# Patient Record
Sex: FEMALE | NOT HISPANIC OR LATINO | Employment: UNEMPLOYED | URBAN - METROPOLITAN AREA
[De-identification: names, ages, dates, MRNs, and addresses within clinical notes are randomized per-mention and may not be internally consistent; named-entity substitution may affect disease eponyms.]

---

## 2021-09-24 ENCOUNTER — OFFICE VISIT (OUTPATIENT)
Dept: FAMILY MEDICINE | Facility: CLINIC | Age: 41
End: 2021-09-24
Payer: COMMERCIAL

## 2021-09-24 VITALS
OXYGEN SATURATION: 99 % | DIASTOLIC BLOOD PRESSURE: 84 MMHG | BODY MASS INDEX: 25.07 KG/M2 | HEART RATE: 82 BPM | WEIGHT: 141.5 LBS | TEMPERATURE: 97.8 F | SYSTOLIC BLOOD PRESSURE: 116 MMHG

## 2021-09-24 DIAGNOSIS — D50.8 OTHER IRON DEFICIENCY ANEMIA: ICD-10-CM

## 2021-09-24 DIAGNOSIS — N92.6 MISSED MENSES: Primary | ICD-10-CM

## 2021-09-24 PROBLEM — D50.9 IRON DEFICIENCY ANEMIA: Status: ACTIVE | Noted: 2021-03-23

## 2021-09-24 PROBLEM — F41.1 GAD (GENERALIZED ANXIETY DISORDER): Status: ACTIVE | Noted: 2021-05-13

## 2021-09-24 PROBLEM — E06.3 HYPOTHYROIDISM DUE TO HASHIMOTO'S THYROIDITIS: Status: ACTIVE | Noted: 2021-03-24

## 2021-09-24 LAB
ERYTHROCYTE [DISTWIDTH] IN BLOOD BY AUTOMATED COUNT: 13 % (ref 10–15)
HCG UR QL: NEGATIVE
HCT VFR BLD AUTO: 39.8 % (ref 35–47)
HGB BLD-MCNC: 12.9 G/DL (ref 11.7–15.7)
MCH RBC QN AUTO: 29.4 PG (ref 26.5–33)
MCHC RBC AUTO-ENTMCNC: 32.4 G/DL (ref 31.5–36.5)
MCV RBC AUTO: 91 FL (ref 78–100)
PLATELET # BLD AUTO: 210 10E3/UL (ref 150–450)
RBC # BLD AUTO: 4.39 10E6/UL (ref 3.8–5.2)
WBC # BLD AUTO: 5 10E3/UL (ref 4–11)

## 2021-09-24 PROCEDURE — 84443 ASSAY THYROID STIM HORMONE: CPT | Performed by: PHYSICIAN ASSISTANT

## 2021-09-24 PROCEDURE — 85027 COMPLETE CBC AUTOMATED: CPT | Performed by: PHYSICIAN ASSISTANT

## 2021-09-24 PROCEDURE — 99203 OFFICE O/P NEW LOW 30 MIN: CPT | Performed by: PHYSICIAN ASSISTANT

## 2021-09-24 PROCEDURE — 36415 COLL VENOUS BLD VENIPUNCTURE: CPT | Performed by: PHYSICIAN ASSISTANT

## 2021-09-24 PROCEDURE — 81025 URINE PREGNANCY TEST: CPT | Performed by: PHYSICIAN ASSISTANT

## 2021-09-24 PROCEDURE — 84146 ASSAY OF PROLACTIN: CPT | Performed by: PHYSICIAN ASSISTANT

## 2021-09-24 PROCEDURE — 83001 ASSAY OF GONADOTROPIN (FSH): CPT | Performed by: PHYSICIAN ASSISTANT

## 2021-09-24 PROCEDURE — 83550 IRON BINDING TEST: CPT | Performed by: PHYSICIAN ASSISTANT

## 2021-09-24 PROCEDURE — 82728 ASSAY OF FERRITIN: CPT | Performed by: PHYSICIAN ASSISTANT

## 2021-09-24 RX ORDER — FEXOFENADINE HCL 180 MG/1
TABLET ORAL
COMMUNITY

## 2021-09-24 RX ORDER — ASCORBIC ACID 500 MG
500 TABLET ORAL
COMMUNITY
Start: 2021-03-24 | End: 2022-01-25

## 2021-09-24 RX ORDER — LEVOTHYROXINE SODIUM 50 UG/1
50 TABLET ORAL
COMMUNITY
Start: 2021-05-14 | End: 2022-03-17

## 2021-09-24 RX ORDER — FERROUS SULFATE 325(65) MG
325 TABLET, DELAYED RELEASE (ENTERIC COATED) ORAL
COMMUNITY
Start: 2021-06-09 | End: 2021-12-21

## 2021-09-24 NOTE — PROGRESS NOTES
Assessment & Plan     Missed menses  Check urine pregnancy today.   Further labs obtained as noted below.  Follow up with OB/GYN if testing all normal and menstrual cycle does not return in the next 1-2 months.  Patient is not underweight and PCOS does not seem likely (no signs of metabolic disorder, no acanthosis nigrans)   - HCG Qual, Urine ([id number]); Future  - Follicle stimulating hormone; Future  - Prolactin; Future  - TSH with free T4 reflex; Future  - CBC with platelets; Future  - Ob/Gyn Referral; Future  - HCG Qual, Urine ([id number])  - Follicle stimulating hormone  - Prolactin  - TSH with free T4 reflex  - CBC with platelets    Other iron deficiency anemia    - Ferritin; Future  - Iron and iron binding capacity; Future  - Ferritin  - Iron and iron binding capacity    Ordering of each unique test  23 minutes spent on the date of the encounter doing chart review, history and exam, documentation and further activities per the note        Return if symptoms worsen or fail to improve.    Lesley Pate PA-C  [facility] JOBY Oliver is a 41 year old who presents for the following health issues    HPI     Patient is here because she hasn't had a period for two months. Her last period was on 07/13/21. She states her last period was heavier than normal. She does indicate she took a pregnancy test at home and was negative.    She denies any recent weight loss or changes in diet. She indicates perhaps she is having some hot flashes (but indicates this is not unusual for her). She has some vaginal itching occasionally which she may attribute to dryness.    She has had 1 child. Never had a hysteroscopy or D and C    Review of Systems   GENERAL:  No fevers         Objective    /84 (BP Location: Right arm, Patient Position: Chair, Cuff Size: Adult Regular)   Pulse 82   Temp 97.8  F (36.6  C) (Oral)   Wt 64.2 kg (141 lb 8 oz)   LMP 07/13/2021 (Exact Date)   SpO2 99%    BMI 25.07 kg/m    Body mass index is 25.07 kg/m .  Physical Exam   GENERAL: No acute distress  HEENT: Normocephalic,  CARDIAC: Regular rate and rhythm. No murmurs.  PULMONARY: Lungs are clear to auscultation bilaterally. No wheezes, rhonchi or crackles.  GI: Active bowel sounds, abdomen is soft and non-tender  NEURO: Alert and non-focal    No results found for this or any previous visit (from the past 24 hour(s)).

## 2021-09-27 LAB
FERRITIN SERPL-MCNC: 25 NG/ML (ref 12–150)
FSH SERPL-ACNC: 46.8 IU/L
IRON SATN MFR SERPL: 25 % (ref 15–46)
IRON SERPL-MCNC: 90 UG/DL (ref 35–180)
PROLACTIN SERPL-MCNC: 6 UG/L (ref 3–27)
TIBC SERPL-MCNC: 367 UG/DL (ref 240–430)
TSH SERPL DL<=0.005 MIU/L-ACNC: 2.79 MU/L (ref 0.4–4)

## 2021-10-03 ENCOUNTER — HEALTH MAINTENANCE LETTER (OUTPATIENT)
Age: 41
End: 2021-10-03

## 2021-12-20 ENCOUNTER — MYC MEDICAL ADVICE (OUTPATIENT)
Dept: FAMILY MEDICINE | Facility: CLINIC | Age: 41
End: 2021-12-20
Payer: COMMERCIAL

## 2021-12-20 NOTE — TELEPHONE ENCOUNTER
Called patient regarding below.  Has had period for almost 10 days.  Not heavy bleeding.  Scheduled for tomorrow at 7:40 am.  Also states she moved and needs refills.  Discussed Lesley is not a primary provider but will need to discuss tomorrow if needs some to get by for now.  Daniela Busch RN

## 2021-12-21 ENCOUNTER — OFFICE VISIT (OUTPATIENT)
Dept: FAMILY MEDICINE | Facility: CLINIC | Age: 41
End: 2021-12-21
Payer: COMMERCIAL

## 2021-12-21 VITALS
DIASTOLIC BLOOD PRESSURE: 84 MMHG | TEMPERATURE: 98.1 F | BODY MASS INDEX: 24.38 KG/M2 | HEART RATE: 84 BPM | SYSTOLIC BLOOD PRESSURE: 127 MMHG | HEIGHT: 63 IN | WEIGHT: 137.6 LBS | OXYGEN SATURATION: 99 % | RESPIRATION RATE: 16 BRPM

## 2021-12-21 DIAGNOSIS — D50.8 OTHER IRON DEFICIENCY ANEMIA: ICD-10-CM

## 2021-12-21 DIAGNOSIS — N93.9 VAGINAL BLEEDING: Primary | ICD-10-CM

## 2021-12-21 LAB
ERYTHROCYTE [DISTWIDTH] IN BLOOD BY AUTOMATED COUNT: 12.2 % (ref 10–15)
FERRITIN SERPL-MCNC: 26 NG/ML (ref 12–150)
HCT VFR BLD AUTO: 40.9 % (ref 35–47)
HGB BLD-MCNC: 13.2 G/DL (ref 11.7–15.7)
IRON SATN MFR SERPL: 21 % (ref 15–46)
IRON SERPL-MCNC: 77 UG/DL (ref 35–180)
MCH RBC QN AUTO: 29.7 PG (ref 26.5–33)
MCHC RBC AUTO-ENTMCNC: 32.3 G/DL (ref 31.5–36.5)
MCV RBC AUTO: 92 FL (ref 78–100)
PLATELET # BLD AUTO: 180 10E3/UL (ref 150–450)
RBC # BLD AUTO: 4.44 10E6/UL (ref 3.8–5.2)
TIBC SERPL-MCNC: 364 UG/DL (ref 240–430)
WBC # BLD AUTO: 4.6 10E3/UL (ref 4–11)

## 2021-12-21 PROCEDURE — 82728 ASSAY OF FERRITIN: CPT | Performed by: PHYSICIAN ASSISTANT

## 2021-12-21 PROCEDURE — 83550 IRON BINDING TEST: CPT | Performed by: PHYSICIAN ASSISTANT

## 2021-12-21 PROCEDURE — 85027 COMPLETE CBC AUTOMATED: CPT | Performed by: PHYSICIAN ASSISTANT

## 2021-12-21 PROCEDURE — 99213 OFFICE O/P EST LOW 20 MIN: CPT | Performed by: PHYSICIAN ASSISTANT

## 2021-12-21 PROCEDURE — 36415 COLL VENOUS BLD VENIPUNCTURE: CPT | Performed by: PHYSICIAN ASSISTANT

## 2021-12-21 RX ORDER — FERROUS SULFATE 325(65) MG
325 TABLET, DELAYED RELEASE (ENTERIC COATED) ORAL DAILY
Qty: 90 TABLET | Refills: 3 | Status: SHIPPED | OUTPATIENT
Start: 2021-12-21 | End: 2022-01-25

## 2021-12-21 ASSESSMENT — MIFFLIN-ST. JEOR: SCORE: 1258.28

## 2021-12-21 NOTE — PATIENT INSTRUCTIONS
[facility] [address]   [facility] [address]   Phone: [phone]     Dr. Franklin, Dr. Hancock, Dr. Faust

## 2021-12-21 NOTE — PROGRESS NOTES
Assessment & Plan     Vaginal bleeding  Last time patient was seen she had not had a menstrual cycle in some time now she is having more spotting. Testing last time suggested patient was perimenopausal. Testing obtained today as noted below.   Still recommend follow up with OB/GYN.  - CBC with platelets; Future  - Iron and iron binding capacity; Future  - Ferritin; Future  - CBC with platelets  - Iron and iron binding capacity  - Ferritin    Other iron deficiency anemia    - CBC with platelets; Future  - Iron and iron binding capacity; Future  - Ferritin; Future  - ferrous sulfate (FE TABS) 325 (65 Fe) MG EC tablet; Take 1 tablet (325 mg) by mouth daily  - CBC with platelets  - Iron and iron binding capacity  - Ferritin    Ordering of each unique test  Prescription drug management      Return if symptoms worsen or fail to improve.    ALFONZO Blackmon [facility]    Meagan Oliver is a 41 year old who presents for the following health issues     HPI     Menstrual Concern  Onset/Duration: 19 days  Description:   Duration of bleeding episodes: 19 days  Frequency of periods: (1st day of one to 1st day of next):  every 28 days  Describe bleeding/flow:   Clots: no  Number of pads/day: 1        Cramping: none  Accompanying Signs & Symptoms:  Lightheadedness: dizziness and nausea  Temperature intolerance: no  Nosebleeds/Easy bruising: no  Vaginal Discharge: YES- brownish initially (having more bleeding now)  Acne: no  Change in body hair: no  History:  Patient's last menstrual period was 12/02/2021 (approximate).  Previous normal periods: YES  Contraceptive use: NO  Sexually active: no  Any bleeding after intercourse: not applicable  Abnormal PAP Smears: no  Precipitating or alleviating factors: None  Therapies tried and outcome: None      Review of Systems   GENERAL:  No fevers        Objective    /84 (BP Location: Right arm, Patient Position: Sitting, Cuff Size: Adult  "Regular)   Pulse 84   Temp 98.1  F (36.7  C) (Oral)   Resp 16   Ht 1.6 m (5' 3\")   Wt 62.4 kg (137 lb 9.6 oz)   LMP 12/02/2021 (Approximate)   SpO2 99%   BMI 24.37 kg/m    Body mass index is 24.37 kg/m .  Physical Exam   GENERAL: No acute distress  HEENT: Normocephalic  NEURO: Alert and non-focal          "

## 2022-01-25 ENCOUNTER — OFFICE VISIT (OUTPATIENT)
Dept: OBGYN | Facility: CLINIC | Age: 42
End: 2022-01-25
Payer: COMMERCIAL

## 2022-01-25 VITALS — SYSTOLIC BLOOD PRESSURE: 106 MMHG | BODY MASS INDEX: 24.61 KG/M2 | DIASTOLIC BLOOD PRESSURE: 84 MMHG | WEIGHT: 138.9 LBS

## 2022-01-25 DIAGNOSIS — N95.1 PERIMENOPAUSE: ICD-10-CM

## 2022-01-25 DIAGNOSIS — N93.9 ABNORMAL UTERINE BLEEDING (AUB): Primary | ICD-10-CM

## 2022-01-25 DIAGNOSIS — D50.8 OTHER IRON DEFICIENCY ANEMIA: ICD-10-CM

## 2022-01-25 LAB
CLUE CELLS: ABNORMAL
TRICHOMONAS, WET PREP: ABNORMAL
WBC'S/HIGH POWER FIELD, WET PREP: ABNORMAL
YEAST, WET PREP: ABNORMAL

## 2022-01-25 PROCEDURE — 87491 CHLMYD TRACH DNA AMP PROBE: CPT | Performed by: REGISTERED NURSE

## 2022-01-25 PROCEDURE — 99204 OFFICE O/P NEW MOD 45 MIN: CPT | Performed by: REGISTERED NURSE

## 2022-01-25 PROCEDURE — 87210 SMEAR WET MOUNT SALINE/INK: CPT | Performed by: REGISTERED NURSE

## 2022-01-25 PROCEDURE — 87591 N.GONORRHOEAE DNA AMP PROB: CPT | Performed by: REGISTERED NURSE

## 2022-01-25 RX ORDER — DESOGESTREL AND ETHINYL ESTRADIOL 0.15-0.03
1 KIT ORAL DAILY
Qty: 28 TABLET | Refills: 0 | Status: SHIPPED | OUTPATIENT
Start: 2022-01-25 | End: 2022-09-26

## 2022-01-25 RX ORDER — FERROUS SULFATE 325(65) MG
325 TABLET, DELAYED RELEASE (ENTERIC COATED) ORAL DAILY
Qty: 90 TABLET | Refills: 3 | Status: SHIPPED | OUTPATIENT
Start: 2022-01-25 | End: 2022-09-26

## 2022-01-25 NOTE — PATIENT INSTRUCTIONS
Please either come in for blood pressure check or report blood pressure value taken at pharmacy in 1 month's time. If blood pressure taken at pharmacy, please send value via GELI. Prescription for birth control can then be refilled for 1 year.     Please be seen annually for preventative health visit. Insurance normally covers 1 preventative health visit a year so keep this in mind when scheduling with your primary care provider versus OB/GYN for prevenatative. Continue to be seen for recommended follow-up visits.      Please follow-up with your doctor that is managing your Hashimoto's thyroiditis if you are in need of synthroid refill.        Patient Education     Perimenopause  Menopause is when you stop having periods for good. For many women, this happens around age 50. The time of change before this is called perimenopause. It may start in your late 30s or 40s. It can last for months or years. During this time, your body makes lower levels of female hormones. This causes certain changes in your body. You may already have begun to feel the effects of these changes. By taking steps to relieve symptoms, you can still feel good.   The menstrual cycle  Hormones are chemicals that have specific jobs in the body. A menstrual cycle is caused by changes in the levels of 2 female hormones. These hormones are estrogen and progesterone. They are made by the ovaries. In a normal cycle, estrogen creates a lining in the uterus to allow for pregnancy. The ovary then releases an egg. This is called ovulation. Progesterone levels start to go up. If the egg is not fertilized, progesterone levels go down. This causes the lining in the uterus to be shed. This is the bleeding that is your period.   Changes in hormones  As a woman ages, her ovaries begin to make hormones less regularly. In some months, there may not be ovulation. Without ovulation, progesterone isn't secreted so a normal period doesn't occur. The menstrual cycle  will be harder to predict. But some months ovulation may occur and pregnancy can occur Over time, the ovaries stop working. This can cause symptoms. Some women who have had their uterus taken out (hysterectomy) but still have ovaries may still have symptoms. When estrogen levels reach their lowest point, periods will stop completely. This is menopause.   Symptoms of perimenopause  The change in hormones can cause physical symptoms. It can also cause emotional symptoms. These may include:     Periods that come more or less often    Periods that are lighter or heavier than you re used to    Hot flashes, night sweats, or trouble sleeping    Vaginal dryness, which may make sex painful    Mood swings or fatigue    Palpitations    Sleep disturbances    Urinary symptoms including frequency and incontinence  Medicines that may help  Some medicines can help ease the effects of perimenopause. These include:     Low-dose birth control pills. These often contain both estrogen and progesterone. They can help regulate your periods.    Hormone therapy (HT). This replaces some of the hormones your body has stopped making.    Antidepressants. These help balance brain chemicals that may decrease during this time. Signs of depression can include often feeling sad or hopeless. If you feel this way, be sure to talk to your healthcare provider.    Gabapentin. This is a medicine also used to treat seizures. This controls hot flashes and night sweats in some women.    Clonidine. This medicine may control hot flashes and night sweats.  [employer] last reviewed this educational content on 7/1/2020 2000-2021 [employer]. All rights reserved. This information is not intended as a substitute for professional medical care. Always follow your healthcare professional's instructions.    WARNING SIGNS AND CAUTIONS (ACHES)  Combined Oral Contraceptives (Pills), Combined Transdermal Contraceptives (Patch),  Combined Vaginal Ring  Contraceptives  You have chosen a combined hormone birth control method for your birth control. The following are warning  signs of problems. Remember the word  ACHES .  A - Abdominal (stomach) pain (severe) may mean a possible ruptured liver tumor, cyst, tubal pregnancy  C - Chest pain (sharp, crushing or heaviness) may mean possible heart attack; sudden shortness of breath,  persistent cough or coughing up blood indicating possible blood clot in lungs  H - Headaches (sudden severe) or vomiting, dizziness or fainting, weakness or numbness in an arm or leg or  disturbances of speech may mean a possible stroke  E - Eye problems (blurring vision, flashing lights or partial/complete loss of vision) may mean a possible clot in the  eye or other blood flow problems  S - Sudden leg pain in calf or thigh or redness, heat or swelling in calf or thigh, may mean possible blood clots    If you experience any of these warning signs, call or see a health care provider as soon as possible

## 2022-01-25 NOTE — PROGRESS NOTES
Abnormal Uterine Bleeding Note    Date: 2022    CC:  Abnormal Uterine Bleeding    HPI:  Benito Peña is a 42 year old female  presents for evaluation of abnormal uterine bleeding as a referral from Marge Landeros.        Infections: was having itching around time of bleeding. Itching present on external and internal, not currently itching. Resolved after bleeding stopped. Uses pads. Does share that she uses wipes on the outside and on the inside of her vagina. Cleans out vagina with water.     Has always been sensitive to temperature. No hot flashes. No other signs and symptoms related to perimenopause period.     Her work-up thus far for her abnormal bleeding has included:  - pregnancy test: negative at last visit, no unprotected sex  Since  - FSH- 46.8  - TSH: 2.79  - transvaginal ultrasound: not done   - Hgb: 13.2    - STI screen: offered and accepted   - Last pap: UTD and normal result     Patient has tried to following treatments: None    GYN HISTORY:  Patient's last menstrual period was 2021 (approximate).    STI history: No STD history  Duration of bleeding problem: Abnormal uterine bleeding started this past summer, 2021   Frequency of cyles: 21-60 days, lasting few days to 19 days   Flow: spotting to heavy, changes a  every 2 hours at the heaviest  No pain/cramping   Breakthrough bleeding: no   Post-coital bleeding: no  Pelvic pain: no    History of abnormal pap:  No   History of cervical procedures: No    Contraceptive History: Using condoms currently, has used paraguard one time after birth of son but had it removed. Denies any side effects with use, just did not want it in place anymore  The patient is sexually active with male partners.       Patient has following risk factors for endometrial cancer:  - Unopposed estrogen exposure: No   - Obesity: No  - Smoking: No  - Nulliparity: No  - Infertility: No  - Early age of menarche / late age of menopause: Yes.  Details: at age 13 or 14   - Family history of colon cancer, ovarian cancer, and type I endometrial cancer: No family history     OBSTETRIC HISTORY:   OB History    Para Term  AB Living   1 1 1 0 0 0   SAB IAB Ectopic Multiple Live Births   0 0 0 0 1      # Outcome Date GA Lbr Sudeep/2nd Weight Sex Delivery Anes PTL Lv   1 Term              Patient Active Problem List    Diagnosis Date Noted     ELIA (generalized anxiety disorder) 2021     Priority: Medium     Hypothyroidism due to Hashimoto's thyroiditis 2021     Priority: Medium     Iron deficiency anemia 2021     Priority: Medium     CARDIOVASCULAR SCREENING; LDL GOAL LESS THAN 160 2013     Priority: Medium       Past Medical History:   Diagnosis Date     No active medical problems          Past Surgical History:   Procedure Laterality Date     NO HISTORY OF SURGERY         Family History   Problem Relation Age of Onset     Hypertension Mother      Asthma Maternal Grandmother      There is not  family history of uterine, ovarian, breast, colon cancer.     Current Outpatient Medications   Medication Sig Dispense Refill     ferrous sulfate (FE TABS) 325 (65 Fe) MG EC tablet Take 1 tablet (325 mg) by mouth daily 90 tablet 3     fexofenadine (ALLEGRA ALLERGY) 180 MG tablet        levothyroxine (SYNTHROID/LEVOTHROID) 50 MCG tablet Take 50 mcg by mouth       vitamin C (ASCORBIC ACID) 500 MG tablet Take 500 mg by mouth         Allergies: Patient has no known allergies.    ROS:  C: NEGATIVE for fever, chills, change in weight  I: NEGATIVE for worrisome rashes, moles or lesions  E: NEGATIVE for vision changes or irritation  E/M: NEGATIVE for ear, mouth and throat problems  R: NEGATIVE for significant cough or SOB  CV: NEGATIVE for chest pain, palpitations or peripheral edema  GI: NEGATIVE for nausea, abdominal pain, heartburn, or change in bowel habits  : POSITIVE for abnormal bleeding as above, NEGATIVE for frequency, dysuria,  hematuria, vaginal discharge  M: NEGATIVE for significant arthralgias or myalgia  N: NEGATIVE for weakness, dizziness or paresthesias  E: NEGATIVE for temperature intolerance, skin/hair changes  P: NEGATIVE for changes in mood or affect    EXAM:  Blood pressure 106/84, weight 63 kg (138 lb 14.4 oz), last menstrual period 2021, not currently breastfeeding.   BMI= Body mass index is 24.61 kg/m .  General - pleasant female in no acute distress.  Abdomen - soft, nontender  Pelvic - external genitalia: normal adult female without lesions or abnormalities   BUS: within normal limits  Vagina: well rugated, no discharge, no lesions  Cervix: no lesions or CMT  Uterus: Normal size, mobile and nontender. No fibroids or adhesions suspected  Adnexae: no masses or tenderness.  Rectovaginal - deferred.  Musculoskeletal - no gross deformities.  Neurological - normal strength, sensation, and mental status.    Labs from PCP visit 2021  Results for BENITO HOOVER (MRN [id number]) as of 2022 19:27   Ref. Range 2021 16:42   Ferritin Latest Ref Range: 12 - 150 ng/mL 25   FSH Latest Units: IU/L 46.8   Iron Latest Ref Range: 35 - 180 ug/dL 90   Iron Binding Cap Latest Ref Range: 240 - 430 ug/dL 367   Iron Saturation Index Latest Ref Range: 15 - 46 % 25   Prolactin Latest Ref Range: 3 - 27 ug/L 6   TSH Latest Ref Range: 0.40 - 4.00 mU/L 2.79     Results for BENITO HOOVER (MRN [id number]) as of 2022 19:27   Ref. Range 2021 16:43   HCG Qual Urine Latest Ref Range: Negative  Negative     ASSESSMENT:  Benito Hoover is a 42 year old  who presents with abnormal uterine bleeding. Discussed differential diagnosis: perimenopause      PLAN:    ICD-10-CM    1. Abnormal uterine bleeding (AUB)  N93.9 Wet prep - Clinic Collect     Chlamydia & Gonorrhea by PCR, GICH/Range - Clinic Collect     desogestrel-ethinyl estradiol (APRI) 0.15-30 MG-MCG tablet     ferrous sulfate (FE TABS) 325 (65 Fe) MG EC tablet    2. Perimenopause  N95.1 desogestrel-ethinyl estradiol (APRI) 0.15-30 MG-MCG tablet   3. Other iron deficiency anemia  D50.8 ferrous sulfate (FE TABS) 325 (65 Fe) MG EC tablet         Endometrial biopsy was not today to rule out hyperplasia and malignancy.   Transvaginal ultrasound: no. Bimanual exam normal. Has not had pelvic pain with bleeding, not concerned for fibroids. Given elevated FSH, normal pelvic exam findings, normal TSH and prolactin, negative HCG, and bleeding pattern consistent with perimenopause, perimenopause diagnosed.   Cervical cancer screening: not due at this time   Collected GC/CT and wet prep to rule out infection   Reorderd iron tabs as well- says she has 3 refills but reports pharmacy says she needs to talk to her doctor to refill  Reviewed need to follow-up with provider that is managing Hashimoto's for refill of synthroid as they may require additional lab testing/adjusting of dosage (TSH was normal at 2.79 in 9/2021)  Plan for low dose birth control (apri) for cycle control. Not experiencing vasomotor s/s of perimenopause and desires cycle control so low dose KOLTON better option over HRT. Reviewed alternative therapies including doing nothing, utilizing Mirena IUD, acupuncture, and options for ablation as an option if trial of birth control not successful. Elects KOLTON trial at this time   F/U in one month for BP check. No contraindications to COCs at this time. Will refill for year if BP WNL and no contraindications arise.        Ilda Marie, DNP, APRN, CNM

## 2022-01-25 NOTE — NURSING NOTE
"Chief Complaint   Patient presents with     Abnormal Uterine Bleeding     Nov  spotting started   Perioid Dec 2nd  until first week of    heavy bleeding        Initial /84 (BP Location: Right arm, Cuff Size: Adult Regular)   Wt 63 kg (138 lb 14.4 oz)   LMP 2021 (Approximate)   Breastfeeding No   BMI 24.61 kg/m   Estimated body mass index is 24.61 kg/m  as calculated from the following:    Height as of 21: 1.6 m (5' 3\").    Weight as of this encounter: 63 kg (138 lb 14.4 oz).  BP completed using cuff size: regular    Questioned patient about current smoking habits.  Pt. has never smoked.          The following HM Due: NONE      Yina Burgos, LINSEY on 2022 at 10:09 AM       "

## 2022-01-26 LAB
C TRACH DNA SPEC QL PROBE+SIG AMP: NEGATIVE
N GONORRHOEA DNA SPEC QL NAA+PROBE: NEGATIVE

## 2022-03-15 DIAGNOSIS — E06.3 HYPOTHYROIDISM DUE TO HASHIMOTO'S THYROIDITIS: Primary | ICD-10-CM

## 2022-03-15 RX ORDER — LEVOTHYROXINE SODIUM 50 UG/1
50 TABLET ORAL
Status: CANCELLED | OUTPATIENT
Start: 2022-03-15

## 2022-03-15 NOTE — TELEPHONE ENCOUNTER
Routing refill request to provider for review/approval because:  Medication is reported/historical    Salbador CARTAGENA RN

## 2022-03-15 NOTE — TELEPHONE ENCOUNTER
Medication Question or Refill    Who is calling: Benito    What medication are you calling about (include dose and sig)?: Levothyroxine    Controlled Substance Agreement on file:     Who prescribed the medication?:     Do you need a refill? Yes:     When did you use the medication last?     Patient offered an appointment? No    Do you have any questions or concerns?  No    Requested Pharmacy: [facility]    Okay to leave a detailed message?: Yes at Cell number on file:    Telephone Information:   Mobile [phone]

## 2022-03-17 NOTE — TELEPHONE ENCOUNTER
Called pt,     ~new pt, has never seen NWD  ~pt saw LG 9/24/21, LG did TSH but has not refilled any thyroid medication  ~now pt needs thyroid refill  ~willing to establish care with NWD (someone entered her as PCP but NWD has never seen pt)    TSH   Date Value Ref Range Status   09/24/2021 2.79 0.40 - 4.00 mU/L Final     ROUTED TO  COVERING PROVIDER POOL, PLEASE ADVISE THYROID REFILL, OK TO REFILL X 1?    ROUTE to inform pt of plan  Dana Jolley, RN, BSN  [facility]

## 2022-03-18 RX ORDER — LEVOTHYROXINE SODIUM 50 UG/1
50 TABLET ORAL DAILY
Qty: 90 TABLET | Refills: 0 | Status: SHIPPED | OUTPATIENT
Start: 2022-03-18 | End: 2022-08-05

## 2022-09-02 DIAGNOSIS — E06.3 HYPOTHYROIDISM DUE TO HASHIMOTO'S THYROIDITIS: ICD-10-CM

## 2022-09-02 NOTE — TELEPHONE ENCOUNTER
Routing refill request to provider for review/approval because:  Nancy given x 2  Patient needs to be seen because it has been more than 1 year since last office visit.  Upcoming appt 9/26/22 with Dr. Penaloza.    Daniela Busch RN

## 2022-09-04 ENCOUNTER — HEALTH MAINTENANCE LETTER (OUTPATIENT)
Age: 42
End: 2022-09-04

## 2022-09-06 RX ORDER — LEVOTHYROXINE SODIUM 50 UG/1
TABLET ORAL
Qty: 30 TABLET | Refills: 0 | Status: SHIPPED | OUTPATIENT
Start: 2022-09-06 | End: 2022-10-03

## 2022-09-26 ENCOUNTER — OFFICE VISIT (OUTPATIENT)
Dept: FAMILY MEDICINE | Facility: CLINIC | Age: 42
End: 2022-09-26
Payer: COMMERCIAL

## 2022-09-26 VITALS
DIASTOLIC BLOOD PRESSURE: 62 MMHG | WEIGHT: 132.4 LBS | SYSTOLIC BLOOD PRESSURE: 104 MMHG | RESPIRATION RATE: 12 BRPM | OXYGEN SATURATION: 97 % | TEMPERATURE: 97.6 F | HEART RATE: 66 BPM | BODY MASS INDEX: 23.46 KG/M2 | HEIGHT: 63 IN

## 2022-09-26 DIAGNOSIS — Z00.00 ROUTINE GENERAL MEDICAL EXAMINATION AT A HEALTH CARE FACILITY: Primary | ICD-10-CM

## 2022-09-26 DIAGNOSIS — Z11.4 SCREENING FOR HIV (HUMAN IMMUNODEFICIENCY VIRUS): ICD-10-CM

## 2022-09-26 DIAGNOSIS — Z11.59 NEED FOR HEPATITIS C SCREENING TEST: ICD-10-CM

## 2022-09-26 DIAGNOSIS — D50.0 IRON DEFICIENCY ANEMIA DUE TO CHRONIC BLOOD LOSS: ICD-10-CM

## 2022-09-26 DIAGNOSIS — E06.3 HYPOTHYROIDISM DUE TO HASHIMOTO'S THYROIDITIS: ICD-10-CM

## 2022-09-26 LAB
ERYTHROCYTE [DISTWIDTH] IN BLOOD BY AUTOMATED COUNT: 13.9 % (ref 10–15)
HCT VFR BLD AUTO: 39.1 % (ref 35–47)
HCV AB SERPL QL IA: NONREACTIVE
HGB BLD-MCNC: 13 G/DL (ref 11.7–15.7)
HIV 1+2 AB+HIV1 P24 AG SERPL QL IA: NONREACTIVE
MCH RBC QN AUTO: 29.4 PG (ref 26.5–33)
MCHC RBC AUTO-ENTMCNC: 33.2 G/DL (ref 31.5–36.5)
MCV RBC AUTO: 89 FL (ref 78–100)
PLATELET # BLD AUTO: 180 10E3/UL (ref 150–450)
RBC # BLD AUTO: 4.42 10E6/UL (ref 3.8–5.2)
WBC # BLD AUTO: 4.1 10E3/UL (ref 4–11)

## 2022-09-26 PROCEDURE — 82728 ASSAY OF FERRITIN: CPT | Performed by: FAMILY MEDICINE

## 2022-09-26 PROCEDURE — 84443 ASSAY THYROID STIM HORMONE: CPT | Performed by: FAMILY MEDICINE

## 2022-09-26 PROCEDURE — 85027 COMPLETE CBC AUTOMATED: CPT | Performed by: FAMILY MEDICINE

## 2022-09-26 PROCEDURE — 87389 HIV-1 AG W/HIV-1&-2 AB AG IA: CPT | Performed by: FAMILY MEDICINE

## 2022-09-26 PROCEDURE — 86803 HEPATITIS C AB TEST: CPT | Performed by: FAMILY MEDICINE

## 2022-09-26 PROCEDURE — 90471 IMMUNIZATION ADMIN: CPT | Performed by: FAMILY MEDICINE

## 2022-09-26 PROCEDURE — 99213 OFFICE O/P EST LOW 20 MIN: CPT | Mod: 25 | Performed by: FAMILY MEDICINE

## 2022-09-26 PROCEDURE — 90715 TDAP VACCINE 7 YRS/> IM: CPT | Performed by: FAMILY MEDICINE

## 2022-09-26 PROCEDURE — 36415 COLL VENOUS BLD VENIPUNCTURE: CPT | Performed by: FAMILY MEDICINE

## 2022-09-26 PROCEDURE — 99396 PREV VISIT EST AGE 40-64: CPT | Mod: 25 | Performed by: FAMILY MEDICINE

## 2022-09-26 SDOH — HEALTH STABILITY: PHYSICAL HEALTH: ON AVERAGE, HOW MANY DAYS PER WEEK DO YOU ENGAGE IN MODERATE TO STRENUOUS EXERCISE (LIKE A BRISK WALK)?: 3 DAYS

## 2022-09-26 SDOH — HEALTH STABILITY: PHYSICAL HEALTH: ON AVERAGE, HOW MANY MINUTES DO YOU ENGAGE IN EXERCISE AT THIS LEVEL?: 30 MIN

## 2022-09-26 SDOH — ECONOMIC STABILITY: FOOD INSECURITY: WITHIN THE PAST 12 MONTHS, THE FOOD YOU BOUGHT JUST DIDN'T LAST AND YOU DIDN'T HAVE MONEY TO GET MORE.: NEVER TRUE

## 2022-09-26 SDOH — ECONOMIC STABILITY: INCOME INSECURITY: IN THE LAST 12 MONTHS, WAS THERE A TIME WHEN YOU WERE NOT ABLE TO PAY THE MORTGAGE OR RENT ON TIME?: NO

## 2022-09-26 SDOH — ECONOMIC STABILITY: TRANSPORTATION INSECURITY
IN THE PAST 12 MONTHS, HAS LACK OF TRANSPORTATION KEPT YOU FROM MEETINGS, WORK, OR FROM GETTING THINGS NEEDED FOR DAILY LIVING?: NO

## 2022-09-26 SDOH — ECONOMIC STABILITY: TRANSPORTATION INSECURITY
IN THE PAST 12 MONTHS, HAS THE LACK OF TRANSPORTATION KEPT YOU FROM MEDICAL APPOINTMENTS OR FROM GETTING MEDICATIONS?: NO

## 2022-09-26 SDOH — ECONOMIC STABILITY: FOOD INSECURITY: WITHIN THE PAST 12 MONTHS, YOU WORRIED THAT YOUR FOOD WOULD RUN OUT BEFORE YOU GOT MONEY TO BUY MORE.: PATIENT DECLINED

## 2022-09-26 ASSESSMENT — LIFESTYLE VARIABLES
AUDIT-C TOTAL SCORE: 0
HOW MANY STANDARD DRINKS CONTAINING ALCOHOL DO YOU HAVE ON A TYPICAL DAY: PATIENT DOES NOT DRINK
HOW OFTEN DO YOU HAVE SIX OR MORE DRINKS ON ONE OCCASION: NEVER
HOW OFTEN DO YOU HAVE A DRINK CONTAINING ALCOHOL: NEVER
SKIP TO QUESTIONS 9-10: 1

## 2022-09-26 ASSESSMENT — ENCOUNTER SYMPTOMS: BREAST MASS: 0

## 2022-09-26 ASSESSMENT — SOCIAL DETERMINANTS OF HEALTH (SDOH)
HOW OFTEN DO YOU ATTEND CHURCH OR RELIGIOUS SERVICES?: 1 TO 4 TIMES PER YEAR
IN A TYPICAL WEEK, HOW MANY TIMES DO YOU TALK ON THE PHONE WITH FAMILY, FRIENDS, OR NEIGHBORS?: MORE THAN THREE TIMES A WEEK
DO YOU BELONG TO ANY CLUBS OR ORGANIZATIONS SUCH AS CHURCH GROUPS UNIONS, FRATERNAL OR ATHLETIC GROUPS, OR SCHOOL GROUPS?: NO
HOW OFTEN DO YOU GET TOGETHER WITH FRIENDS OR RELATIVES?: TWICE A WEEK

## 2022-09-26 NOTE — PROGRESS NOTES
SUBJECTIVE:   CC: Benito is an 42 year old who presents for preventive health visit.         Healthy Habits:     Getting at least 3 servings of Calcium per day:  Yes    Bi-annual eye exam:  Yes    Dental care twice a year:  NO    Sleep apnea or symptoms of sleep apnea:  Daytime drowsiness    Diet:  Regular (no restrictions)    Frequency of exercise:  2-3 days/week    Duration of exercise:  Less than 15 minutes    Taking medications regularly:  Yes    PHQ-2 Total Score: 1    Additional concerns today:  No          Hypothyroidism Follow-up      Since last visit, patient describes the following symptoms: Weight stable, no hair loss, no skin changes, no constipation, no loose stools      Today's PHQ-2 Score:   PHQ-2 ( 1999 [employer]) 9/26/2022   Q1: Little interest or pleasure in doing things 1   Q2: Feeling down, depressed or hopeless 0   PHQ-2 Score 1   Q1: Little interest or pleasure in doing things Several days   Q2: Feeling down, depressed or hopeless Not at all   PHQ-2 Score 1       Abuse: Current or Past (Physical, Sexual or Emotional) - No  Do you feel safe in your environment? Yes      Social History     Tobacco Use     Smoking status: Never Smoker     Smokeless tobacco: Never Used   Substance Use Topics     Alcohol use: No         Alcohol Use 9/26/2022   Prescreen: >3 drinks/day or >7 drinks/week? No       Reviewed orders with patient.  Reviewed health maintenance and updated orders accordingly - Yes  BP Readings from Last 3 Encounters:   09/26/22 104/62   01/25/22 106/84   12/21/21 127/84    Wt Readings from Last 3 Encounters:   09/26/22 60.1 kg (132 lb 6.4 oz)   01/25/22 63 kg (138 lb 14.4 oz)   12/21/21 62.4 kg (137 lb 9.6 oz)                  Patient Active Problem List   Diagnosis     CARDIOVASCULAR SCREENING; LDL GOAL LESS THAN 160     Iron deficiency anemia     Hypothyroidism due to Hashimoto's thyroiditis     ELIA (generalized anxiety disorder)     Past Surgical History:   Procedure Laterality Date      NO HISTORY OF SURGERY         Social History     Tobacco Use     Smoking status: Never Smoker     Smokeless tobacco: Never Used   Substance Use Topics     Alcohol use: No     Family History   Problem Relation Age of Onset     Hypertension Mother      Asthma Maternal Grandmother          Current Outpatient Medications   Medication Sig Dispense Refill     desogestrel-ethinyl estradiol (APRI) 0.15-30 MG-MCG tablet Take 1 tablet by mouth daily for 28 days 28 tablet 0     ferrous sulfate (FE TABS) 325 (65 Fe) MG EC tablet Take 1 tablet (325 mg) by mouth daily Take with vitamin C. 90 tablet 3     fexofenadine (ALLEGRA ALLERGY) 180 MG tablet        levothyroxine (SYNTHROID/LEVOTHROID) 50 MCG tablet TAKE 1 TABLET BY MOUTH EVERY DAY 30 tablet 0       Breast Cancer Screening:    Breast CA Risk Assessment (FHS-7) 9/26/2022   Do you have a family history of breast, colon, or ovarian cancer? No / Unknown       click delete button to remove this line now  Mammogram Screening - Offered annual screening and updated Health Maintenance for mutual plan based on risk factor consideration    Pertinent mammograms are reviewed under the imaging tab.    History of abnormal Pap smear: NO - age 30-65 PAP every 5 years with negative HPV co-testing recommended     Reviewed and updated as needed this visit by clinical staff   Tobacco  Allergies  Meds   Med Hx  Surg Hx  Fam Hx  Soc Hx          Reviewed and updated as needed this visit by Provider                   Past Medical History:   Diagnosis Date     No active medical problems       Past Surgical History:   Procedure Laterality Date     NO HISTORY OF SURGERY         Review of Systems   Breasts:  Negative for tenderness, breast mass and discharge.   Genitourinary: Negative for pelvic pain, vaginal bleeding and vaginal discharge.     CONSTITUTIONAL: NEGATIVE for fever, chills, change in weight  INTEGUMENTARU/SKIN: NEGATIVE for worrisome rashes, moles or lesions  EYES: NEGATIVE for  "vision changes or irritation  ENT: NEGATIVE for ear, mouth and throat problems  RESP: NEGATIVE for significant cough or SOB  BREAST: NEGATIVE for masses, tenderness or discharge  CV: NEGATIVE for chest pain, palpitations or peripheral edema  GI: NEGATIVE for nausea, abdominal pain, heartburn, or change in bowel habits  : NEGATIVE for unusual urinary or vaginal symptoms. Periods are regular.  MUSCULOSKELETAL: NEGATIVE for significant arthralgias or myalgia  NEURO: NEGATIVE for weakness, dizziness or paresthesias  PSYCHIATRIC: NEGATIVE for changes in mood or affect     OBJECTIVE:   /62 (BP Location: Right arm, Patient Position: Sitting, Cuff Size: Adult Regular)   Pulse 66   Temp 97.6  F (36.4  C) (Oral)   Resp 12   Ht 1.6 m (5' 3\")   Wt 60.1 kg (132 lb 6.4 oz)   LMP  (Within Weeks)   SpO2 97%   BMI 23.45 kg/m    Physical Exam  GENERAL: healthy, alert and no distress  EYES: Eyes grossly normal to inspection, PERRL and conjunctivae and sclerae normal  HENT: ear canals and TM's normal, nose and mouth without ulcers or lesions  NECK: no adenopathy, no asymmetry, masses, or scars and thyroid normal to palpation  RESP: lungs clear to auscultation - no rales, rhonchi or wheezes  CV: regular rate and rhythm, normal S1 S2, no S3 or S4, no murmur, click or rub, no peripheral edema and peripheral pulses strong  ABDOMEN: soft, nontender, no hepatosplenomegaly, no masses and bowel sounds normal  MS: no gross musculoskeletal defects noted, no edema  SKIN: no suspicious lesions or rashes  NEURO: Normal strength and tone, mentation intact and speech normal  PSYCH: mentation appears normal, affect normal/bright        ASSESSMENT/PLAN:   (Z00.00) Routine general medical examination at a health care facility  (primary encounter diagnosis)  Comment: doing excellent  Plan: encourage exercise, discussed supplements today.     (Z11.4) Screening for HIV (human immunodeficiency virus)  Comment:   Plan: HIV Antigen Antibody " "Combo            (Z11.59) Need for hepatitis C screening test  Comment:   Plan: Hepatitis C Screen Reflex to HCV RNA Quant and         Genotype            (E03.8,  E06.3) Hypothyroidism due to Hashimoto's thyroiditis  Comment: check TSH and renew medicine accordingly.  Plan: TSH with free T4 reflex            (D50.0) Iron deficiency anemia due to chronic blood loss  Comment: hx of anemia in the past. Will recheck   Plan: CBC with platelets, Ferritin                  COUNSELING:  Reviewed preventive health counseling, as reflected in patient instructions       Healthy diet/nutrition    Estimated body mass index is 23.45 kg/m  as calculated from the following:    Height as of this encounter: 1.6 m (5' 3\").    Weight as of this encounter: 60.1 kg (132 lb 6.4 oz).        She reports that she has never smoked. She has never used smokeless tobacco.      Counseling Resources:  ATP IV Guidelines  Pooled Cohorts Equation Calculator  Breast Cancer Risk Calculator  BRCA-Related Cancer Risk Assessment: FHS-7 Tool  FRAX Risk Assessment  ICSI Preventive Guidelines  Dietary Guidelines for Americans, 2010  USDA's MyPlate  ASA Prophylaxis  Lung CA Screening    Al Penaloza MD  [facility]  "

## 2022-09-28 LAB
FERRITIN SERPL-MCNC: 22 NG/ML (ref 12–150)
TSH SERPL DL<=0.005 MIU/L-ACNC: 1.94 MU/L (ref 0.4–4)

## 2022-10-01 DIAGNOSIS — E06.3 HYPOTHYROIDISM DUE TO HASHIMOTO'S THYROIDITIS: ICD-10-CM

## 2022-10-03 RX ORDER — LEVOTHYROXINE SODIUM 50 UG/1
TABLET ORAL
Qty: 90 TABLET | Refills: 2 | Status: SHIPPED | OUTPATIENT
Start: 2022-10-03 | End: 2023-07-07

## 2023-07-06 DIAGNOSIS — E06.3 HYPOTHYROIDISM DUE TO HASHIMOTO'S THYROIDITIS: ICD-10-CM

## 2023-07-07 RX ORDER — LEVOTHYROXINE SODIUM 50 UG/1
TABLET ORAL
Qty: 90 TABLET | Refills: 0 | Status: SHIPPED | OUTPATIENT
Start: 2023-07-07 | End: 2023-10-10

## 2023-07-07 NOTE — TELEPHONE ENCOUNTER
Prescription approved per [facility] Refill Protocol.  Meg EWING RN, BSN

## 2023-08-28 ENCOUNTER — PATIENT OUTREACH (OUTPATIENT)
Dept: CARE COORDINATION | Facility: CLINIC | Age: 43
End: 2023-08-28
Payer: COMMERCIAL

## 2023-09-11 ENCOUNTER — PATIENT OUTREACH (OUTPATIENT)
Dept: CARE COORDINATION | Facility: CLINIC | Age: 43
End: 2023-09-11
Payer: COMMERCIAL

## 2023-10-07 DIAGNOSIS — E06.3 HYPOTHYROIDISM DUE TO HASHIMOTO'S THYROIDITIS: ICD-10-CM

## 2023-10-10 RX ORDER — LEVOTHYROXINE SODIUM 50 UG/1
TABLET ORAL
Qty: 90 TABLET | Refills: 0 | Status: SHIPPED | OUTPATIENT
Start: 2023-10-10

## 2024-01-10 DIAGNOSIS — E06.3 HYPOTHYROIDISM DUE TO HASHIMOTO'S THYROIDITIS: ICD-10-CM

## 2024-01-10 RX ORDER — LEVOTHYROXINE SODIUM 50 UG/1
TABLET ORAL
Qty: 90 TABLET | Refills: 0 | OUTPATIENT
Start: 2024-01-10

## 2024-02-18 ENCOUNTER — HEALTH MAINTENANCE LETTER (OUTPATIENT)
Age: 44
End: 2024-02-18

## 2024-11-24 ENCOUNTER — HEALTH MAINTENANCE LETTER (OUTPATIENT)
Age: 44
End: 2024-11-24